# Patient Record
Sex: FEMALE | Race: BLACK OR AFRICAN AMERICAN | NOT HISPANIC OR LATINO | URBAN - METROPOLITAN AREA
[De-identification: names, ages, dates, MRNs, and addresses within clinical notes are randomized per-mention and may not be internally consistent; named-entity substitution may affect disease eponyms.]

---

## 2018-03-15 ENCOUNTER — EMERGENCY (EMERGENCY)
Facility: HOSPITAL | Age: 63
LOS: 1 days | Discharge: HOME | End: 2018-03-15
Attending: EMERGENCY MEDICINE | Admitting: EMERGENCY MEDICINE
Payer: COMMERCIAL

## 2018-03-15 VITALS
TEMPERATURE: 98 F | HEART RATE: 93 BPM | WEIGHT: 149.91 LBS | OXYGEN SATURATION: 99 % | RESPIRATION RATE: 17 BRPM | SYSTOLIC BLOOD PRESSURE: 187 MMHG | DIASTOLIC BLOOD PRESSURE: 101 MMHG

## 2018-03-15 VITALS
TEMPERATURE: 99 F | SYSTOLIC BLOOD PRESSURE: 165 MMHG | DIASTOLIC BLOOD PRESSURE: 78 MMHG | OXYGEN SATURATION: 99 % | RESPIRATION RATE: 16 BRPM | HEART RATE: 68 BPM

## 2018-03-15 DIAGNOSIS — I10 ESSENTIAL (PRIMARY) HYPERTENSION: ICD-10-CM

## 2018-03-15 DIAGNOSIS — Z79.899 OTHER LONG TERM (CURRENT) DRUG THERAPY: ICD-10-CM

## 2018-03-15 DIAGNOSIS — F17.210 NICOTINE DEPENDENCE, CIGARETTES, UNCOMPLICATED: ICD-10-CM

## 2018-03-15 PROCEDURE — 70450 CT HEAD/BRAIN W/O DYE: CPT

## 2018-03-15 PROCEDURE — 99284 EMERGENCY DEPT VISIT MOD MDM: CPT | Mod: 25

## 2018-03-15 PROCEDURE — 93010 ELECTROCARDIOGRAM REPORT: CPT

## 2018-03-15 PROCEDURE — 93005 ELECTROCARDIOGRAM TRACING: CPT

## 2018-03-15 PROCEDURE — 70450 CT HEAD/BRAIN W/O DYE: CPT | Mod: 26

## 2018-03-15 RX ORDER — LISINOPRIL 2.5 MG/1
1 TABLET ORAL
Qty: 0 | Refills: 0 | COMMUNITY

## 2018-03-15 RX ORDER — LISINOPRIL 2.5 MG/1
20 TABLET ORAL ONCE
Qty: 0 | Refills: 0 | Status: COMPLETED | OUTPATIENT
Start: 2018-03-15 | End: 2018-03-15

## 2018-03-15 RX ORDER — LISINOPRIL 2.5 MG/1
1 TABLET ORAL
Qty: 7 | Refills: 0 | OUTPATIENT
Start: 2018-03-15 | End: 2018-03-21

## 2018-03-15 RX ADMIN — LISINOPRIL 20 MILLIGRAM(S): 2.5 TABLET ORAL at 18:34

## 2018-03-15 NOTE — ED ADULT NURSE REASSESSMENT NOTE - NS ED NURSE REASSESS COMMENT FT1
Patient a/oX 3, c/o of 8/10 headache and elevated BP, states has some blurring of vision, no nausea/vomitting/dizziness, no other neuro deficits.  NSR on EKG, Elevated BP, other vitals stable. Lisinopril 20mg PO adminsitered w/ good effects.  CT scan head done.  Stable and comfortable.  Results and disposition pending.

## 2018-03-15 NOTE — ED ADULT NURSE REASSESSMENT NOTE - NS ED NURSE REASSESS COMMENT FT1
Headache resolved, BP improved, states feeling better, vital signs stable.  Discharged to home in stable condition.

## 2018-03-15 NOTE — ED PROVIDER NOTE - EYES NEGATIVE STATEMENT, MLM
no discharge, no irritation, no pain, no redness, and no visual changes. no discharge, no irritation, no pain, no redness, +blurry vision

## 2018-03-15 NOTE — ED ADULT NURSE NOTE - OBJECTIVE STATEMENT
Patient c/o of throbbing headache w/ some blurring of vision, started this day, denies any nausea/vomitting or dizziness.  No facial asymmetry, weakness, slurred speech or arm drift.  NSR on EKG in ED.

## 2018-03-15 NOTE — ED PROVIDER NOTE - MEDICAL DECISION MAKING DETAILS
63yo F w/ PMhx of HTN presents for elevated BP. Initially 187/101, however pt under stress as mother was treated for stroke in the hospital today and pt did not take her lisinopril 20mg. 63yo F w/ PMhx of HTN presents for elevated BP. Initially 187/101, however pt under stress as mother was treated for stroke in the hospital today and pt did not take her lisinopril 20mg. Gave her home lisionpril 20mg x1. CT head showing ___. Repeat BP ____. 63yo F w/ PMhx of HTN presents for elevated BP. Initially 187/101, however pt under stress as mother was treated for stroke in the hospital today and pt did not take her lisinopril 20mg. Gave her home lisionpril 20mg x1. CT head showing ___. Repeat /92 61yo F w/ PMhx of HTN presents for elevated BP. Initially 187/101, however pt under stress as mother was treated for stroke in the hospital today and pt did not take her lisinopril 20mg. Gave her home lisinopril 20mg x1. CT head neg for acute infarct. Repeat /92. Stable for DC home. Rx'ed 7 days of lisinopril 20mg qday. Pt instructed to return to the ED if she experiences worsening headache, vision changes.

## 2018-03-15 NOTE — ED PROVIDER NOTE - OBJECTIVE STATEMENT
63yo F w/ PMhx of HTN presents for high BP. Pt states that her mother was dx'ed with a stroke today, admitted to [facility] and underwent surgery. Pt was extremely anxious and stressed about her mother's situation and felt her BP was high, so she took her own BP in the hospital and stated it was 220/100. Pt did not take her lisinopril 20mg qday this morning as she recently ran out of her rx. She states her SBP is normally around 150-160. Pt states that she had a headache when her BP was elevated, but took 2 tylenol and feels much improved. She denies trouble urinating,  chest pain, SOB, abd pain, fever, chills, nausea, vomiting, diplopia. 61yo F w/ PMhx of HTN presents for high BP. Pt states that her mother was dx'ed with a stroke today, admitted to [facility] and underwent surgery. Pt was extremely anxious and stressed about her mother's situation and felt her BP was high, so she took her own BP in the hospital and stated it was 220/100. Pt did not take her lisinopril 20mg qday this morning as she recently ran out of her rx. She states her SBP is normally around 150-160. Pt states that she had a headache when her BP was elevated, but took 2 tylenol and feels much improved, however still has mild headache. She denies trouble urinating, chest pain, SOB, abd pain, fever, chills, nausea, vomiting, diplopia.

## 2018-03-15 NOTE — ED ADULT TRIAGE NOTE - CHIEF COMPLAINT QUOTE
"My mother just had surgery and I am really stressed and think my pressure is high." c/o headache, blurry vision. Hx HTN (on lisinopril)

## 2018-03-15 NOTE — ED PROVIDER NOTE - ENMT NEGATIVE STATEMENT, MLM
Ears: no ear pain and no hearing problems.Nose: no nasal congestion and no nasal drainage.Mouth/Throat: no dysphagia, no hoarseness and no throat pain.Neck: no lumps, no pain, no stiffness and no swollen glands. Ears: no ear pain and no hearing problems. Nose: no nasal congestion and no nasal drainage. Mouth/Throat: no dysphagia, no hoarseness and no throat pain.Neck: no lumps, no pain, no stiffness and no swollen glands.

## 2018-03-15 NOTE — ED PROVIDER NOTE - NEURO NEGATIVE STATEMENT, MLM
no loss of consciousness, no gait abnormality, no headache, no sensory deficits, and no weakness. no loss of consciousness, no gait abnormality, no sensory deficits, and no weakness. + mild global headache

## 2018-03-15 NOTE — ED PROVIDER NOTE - ATTENDING CONTRIBUTION TO CARE
62 yof pw HTN today, increased stress at home.  also missed her AM lisinopril.  had HA which improved after 2x tylenol.  no cp, no sob, no speech change, no unsteadiness, no other complaints.     agree w/ resident, noted hypertension, ao x 4, nonfocal neuro, will ct head, give home dose meds, follow up if CT is neg, low suspicion for hypertensive emergency

## 2018-03-15 NOTE — ED ADULT NURSE NOTE - CHPI ED SYMPTOMS NEG
no fever/no nausea/no numbness/no chills/no tingling/no vomiting/no weakness/no dizziness/no decreased eating/drinking

## 2023-03-27 NOTE — ED ADULT TRIAGE NOTE - WEIGHT IN LBS
149.9 You can access the FollowMyHealth Patient Portal offered by [facility] by registering at the following website: [email or web address]. By joining [facility] [employer]’s FollowMyHealth portal, you will also be able to view your health information using other applications (apps) compatible with our system.

## 2024-05-01 NOTE — ED ADULT NURSE NOTE - NS ED NURSE LEVEL OF CONSCIOUSNESS ORIENTATION
Problem: Potential for Falls  Goal: Patient will remain free of falls  Description: INTERVENTIONS:  - Educate patient/family on patient safety including physical limitations  - Instruct patient to call for assistance with activity   - Consult OT/PT to assist with strengthening/mobility   - Keep Call bell within reach  - Keep bed low and locked with side rails adjusted as appropriate  - Keep care items and personal belongings within reach  - Initiate and maintain comfort rounds  - Make Fall Risk Sign visible to staff  - Offer Toileting every 2 Hours, in advance of need  - Initiate/Maintain bed alarm  - Obtain necessary fall risk management equipment:    - Apply yellow socks and bracelet for high fall risk patients  - Consider moving patient to room near nurses station  Outcome: Progressing     Problem: Prexisting or High Potential for Compromised Skin Integrity  Goal: Skin integrity is maintained or improved  Description: INTERVENTIONS:  - Identify patients at risk for skin breakdown  - Assess and monitor skin integrity  - Assess and monitor nutrition and hydration status  - Monitor labs   - Assess for incontinence   - Turn and reposition patient  - Assist with mobility/ambulation  - Relieve pressure over bony prominences  - Avoid friction and shearing  - Provide appropriate hygiene as needed including keeping skin clean and dry  - Evaluate need for skin moisturizer/barrier cream  - Collaborate with interdisciplinary team   - Patient/family teaching  - Consider wound care consult   Outcome: Progressing     Problem: PAIN - ADULT  Goal: Verbalizes/displays adequate comfort level or baseline comfort level  Description: Interventions:  - Encourage patient to monitor pain and request assistance  - Assess pain using appropriate pain scale  - Administer analgesics based on type and severity of pain and evaluate response  - Implement non-pharmacological measures as appropriate and evaluate response  - Consider cultural and  social influences on pain and pain management  - Notify physician/advanced practitioner if interventions unsuccessful or patient reports new pain  Outcome: Progressing     Problem: INFECTION - ADULT  Goal: Absence or prevention of progression during hospitalization  Description: INTERVENTIONS:  - Assess and monitor for signs and symptoms of infection  - Monitor lab/diagnostic results  - Monitor all insertion sites, i.e. indwelling lines, tubes, and drains  - Monitor endotracheal if appropriate and nasal secretions for changes in amount and color  - Advance appropriate cooling/warming therapies per order  - Administer medications as ordered  - Instruct and encourage patient and family to use good hand hygiene technique  - Identify and instruct in appropriate isolation precautions for identified infection/condition  Outcome: Progressing     Problem: SAFETY ADULT  Goal: Patient will remain free of falls  Description: INTERVENTIONS:  - Educate patient/family on patient safety including physical limitations  - Instruct patient to call for assistance with activity   - Consult OT/PT to assist with strengthening/mobility   - Keep Call bell within reach  - Keep bed low and locked with side rails adjusted as appropriate  - Keep care items and personal belongings within reach  - Initiate and maintain comfort rounds  - Make Fall Risk Sign visible to staff  - Offer Toileting every 2 Hours, in advance of need  - Initiate/Maintain bed alarm  - Obtain necessary fall risk management equipment:    - Apply yellow socks and bracelet for high fall risk patients  - Consider moving patient to room near nurses station  Outcome: Progressing  Goal: Maintains/Returns to pre admission functional level  Description: INTERVENTIONS:  - Perform AM-PAC 6 Click Basic Mobility/ Daily Activity assessment daily.  - Set and communicate daily mobility goal to care team and patient/family/caregiver.   - Collaborate with rehabilitation services on  mobility goals if consulted  - Perform Range of Motion 3 times a day.  - Reposition patient every 2 hours.  - Dangle patient 3 times a day  - Stand patient 3 times a day  - Out of bed to chair 3 times a day   - Out of bed for meals 3 times a day  - Out of bed for toileting  - Record patient progress and toleration of activity level   Outcome: Progressing     Problem: DISCHARGE PLANNING  Goal: Discharge to home or other facility with appropriate resources  Description: INTERVENTIONS:  - Identify barriers to discharge w/patient and caregiver  - Arrange for needed discharge resources and transportation as appropriate  - Identify discharge learning needs (meds, wound care, etc.)  - Arrange for interpretive services to assist at discharge as needed  - Refer to Case Management Department for coordinating discharge planning if the patient needs post-hospital services based on physician/advanced practitioner order or complex needs related to functional status, cognitive ability, or social support system  Outcome: Progressing     Problem: GENITOURINARY - ADULT  Goal: Absence of urinary retention  Description: INTERVENTIONS:  - Assess patient’s ability to void and empty bladder  - Monitor I/O  - Bladder scan as needed  - Discuss with physician/AP medications to alleviate retention as needed  - Discuss catheterization for long term situations as appropriate  Outcome: Progressing     Problem: SKIN/TISSUE INTEGRITY - ADULT  Goal: Skin Integrity remains intact(Skin Breakdown Prevention)  Description: Assess:  -Perform Rosales assessment every shift  -Clean and moisturize skin every shift  -Inspect skin when repositioning, toileting, and assisting with ADLS  -Assess extremities for adequate circulation and sensation     Bed Management:  -Have minimal linens on bed & keep smooth, unwrinkled  -Change linens as needed when moist or perspiring  -Avoid sitting or lying in one position for more than 2 hours while in bed  -Keep HOB at  30degrees     Toileting:  -Offer bedside commode  -Assess for incontinence every shift  -Use incontinent care products after each incontinent episode     Activity:  -Mobilize patient 3 times a day  -Encourage activity and walks on unit  -Encourage or provide ROM exercises   -Turn and reposition patient every 2 Hours  -Use appropriate equipment to lift or move patient in bed  -Instruct/ Assist with weight shifting every 2 hrs when out of bed in chair  -Consider limitation of chair time 2 hour intervals    Skin Care:  -Avoid use of baby powder, tape, friction and shearing, hot water or constrictive clothing  -Relieve pressure over bony prominences using allevyn foam  -Do not massage red bony areas    Outcome: Progressing     Problem: Nutrition/Hydration-ADULT  Goal: Nutrient/Hydration intake appropriate for improving, restoring or maintaining nutritional needs  Description: Monitor and assess patient's nutrition/hydration status for malnutrition. Collaborate with interdisciplinary team and initiate plan and interventions as ordered.  Monitor patient's weight and dietary intake as ordered or per policy. Utilize nutrition screening tool and intervene as necessary. Determine patient's food preferences and provide high-protein, high-caloric foods as appropriate.     INTERVENTIONS:  - Monitor oral intake, urinary output, labs, and treatment plans  - Assess nutrition and hydration status and recommend course of action  - Evaluate amount of meals eaten  - Assist patient with eating if necessary   - Allow adequate time for meals  - Recommend/ encourage appropriate diets, oral nutritional supplements, and vitamin/mineral supplements  - Order, calculate, and assess calorie counts as needed  - Recommend, monitor, and adjust tube feedings and TPN/PPN based on assessed needs  - Assess need for intravenous fluids  - Provide specific nutrition/hydration education as appropriate  - Include patient/family/caregiver in decisions  Oriented - self; Oriented - place; Oriented - time related to nutrition  Outcome: Progressing